# Patient Record
Sex: MALE | Race: WHITE | URBAN - METROPOLITAN AREA
[De-identification: names, ages, dates, MRNs, and addresses within clinical notes are randomized per-mention and may not be internally consistent; named-entity substitution may affect disease eponyms.]

---

## 2017-05-08 ENCOUNTER — OFFICE (OUTPATIENT)
Dept: URBAN - METROPOLITAN AREA CLINIC 78 | Facility: CLINIC | Age: 77
End: 2017-05-08

## 2017-05-08 VITALS
SYSTOLIC BLOOD PRESSURE: 135 MMHG | WEIGHT: 244 LBS | DIASTOLIC BLOOD PRESSURE: 76 MMHG | HEART RATE: 83 BPM | HEIGHT: 78 IN | TEMPERATURE: 97.3 F

## 2017-05-08 DIAGNOSIS — D68.61 ANTIPHOSPHOLIPID SYNDROME: ICD-10-CM

## 2017-05-08 DIAGNOSIS — Z12.11 ENCOUNTER FOR SCREENING FOR MALIGNANT NEOPLASM OF COLON: ICD-10-CM

## 2017-05-08 DIAGNOSIS — Z79.01 LONG TERM (CURRENT) USE OF ANTICOAGULANTS: ICD-10-CM

## 2017-05-08 PROCEDURE — 99204 OFFICE O/P NEW MOD 45 MIN: CPT

## 2017-05-08 NOTE — SERVICEHPINOTES
CARI GREGORY   is a   77   year old male who is being seen in consultation at the request of   DAGOBERTO HONG   to discuss a screening colonoscopy. He has only had one complete colonoscopy many years ago and then two incomplete ones since then -- last was about 10 years ago. Procedures were not done under sedation so had to be terminated early due to pain (not a prep issue). He reports at least one BM daily denies constipation, change in bowel habits, or blood in stools. He denies any GI concerns.MAL Gupta has PMH DVT and antiphospholipid syndrome so takes warfarin regularly. He has needed to do Lovenox bridging for other procedures/surgeries so has already discussed with his hematologist and has the Lovenox on hand. He also has ability to do home PT/INR test. He denies h/o heart disease.

## 2017-06-01 ENCOUNTER — AMBULATORY SURGICAL CENTER (OUTPATIENT)
Dept: URBAN - METROPOLITAN AREA SURGERY 21 | Facility: SURGERY | Age: 77
End: 2017-06-01

## 2017-06-01 DIAGNOSIS — D12.2 BENIGN NEOPLASM OF ASCENDING COLON: ICD-10-CM

## 2017-06-01 DIAGNOSIS — Z12.11 ENCOUNTER FOR SCREENING FOR MALIGNANT NEOPLASM OF COLON: ICD-10-CM

## 2017-06-01 DIAGNOSIS — K56.2 VOLVULUS: ICD-10-CM

## 2017-06-01 PROCEDURE — 45380 COLONOSCOPY AND BIOPSY: CPT | Mod: PT

## 2017-07-27 ENCOUNTER — OFFICE (OUTPATIENT)
Dept: URBAN - METROPOLITAN AREA CLINIC 78 | Facility: CLINIC | Age: 77
End: 2017-07-27

## 2017-07-27 VITALS
TEMPERATURE: 97.2 F | HEIGHT: 78 IN | WEIGHT: 227 LBS | HEART RATE: 72 BPM | SYSTOLIC BLOOD PRESSURE: 112 MMHG | DIASTOLIC BLOOD PRESSURE: 66 MMHG

## 2017-07-27 DIAGNOSIS — R19.7 DIARRHEA, UNSPECIFIED: ICD-10-CM

## 2017-07-27 DIAGNOSIS — R10.84 GENERALIZED ABDOMINAL PAIN: ICD-10-CM

## 2017-07-27 DIAGNOSIS — R10.13 EPIGASTRIC PAIN: ICD-10-CM

## 2017-07-27 DIAGNOSIS — Z79.01 LONG TERM (CURRENT) USE OF ANTICOAGULANTS: ICD-10-CM

## 2017-07-27 DIAGNOSIS — D68.61 ANTIPHOSPHOLIPID SYNDROME: ICD-10-CM

## 2017-07-27 PROCEDURE — 99215 OFFICE O/P EST HI 40 MIN: CPT

## 2017-07-27 RX ORDER — DICYCLOMINE HYDROCHLORIDE 10 MG/1
CAPSULE ORAL
Qty: 90 | Refills: 1 | Status: COMPLETED
Start: 2017-07-27 | End: 2017-09-18

## 2017-07-27 NOTE — SERVICEHPINOTES
76 yo male presents with diarrhea and abdominal pain. He had a colonoscopy on June 1st for routine screening - had adenomatous polyp, tortuous colon, small angiodysplastic lesion in cecum, and hemorrhoids. He started having some diarrhea a couple weeks later. He also was having some vomiting and abdominal pain. He went to [facility], was sent to ER, had CT scan and was told partial SBO and hernia. Was kept overnight, saw surgeon, was told this resolved by next morning. He continued to have symptoms and had further w/u by Dr. Magana ([facility]). Still had partial SBO and it was then noted that his CT showed small nodular mesenteric mass. Has PET scan scheduled for August 1st. He has continued to have some diarrhea, though had a formed BM yesterday (but had used Imodium). Currently having one BM per day but can have up to 4 per day. Has lost nearly 20 pounds. He is eating yogurt, eggs, soup, Ensure. After eating he has abdominal pain from upper abdomen to lower abdomen. Pain is crampy and he has a lot of bloating.He has PMH DVT and antiphospholipid syndrome so takes warfarin regularly. He has needed to do Lovenox bridging for other procedures/surgeries so has already discussed with his hematologist and has the Lovenox on hand. He also has ability to do home PT/INR test. He denies h/o heart disease.

## 2017-07-31 LAB
C DIFFICILE TOXIN GENE NAA: NEGATIVE
RESULT: RESULT 1: (no result)
RESULT: RESULT 1: (no result)
STOOL CULTURE: CAMPYLOBACTER CULTURE: (no result)
STOOL CULTURE: E COLI SHIGA TOXIN EIA: NEGATIVE
STOOL CULTURE: SALMONELLA/SHIGELLA SCREEN: (no result)

## 2017-08-14 ENCOUNTER — AMBULATORY SURGICAL CENTER (OUTPATIENT)
Dept: URBAN - METROPOLITAN AREA SURGERY 21 | Facility: SURGERY | Age: 77
End: 2017-08-14

## 2017-08-14 DIAGNOSIS — R10.13 EPIGASTRIC PAIN: ICD-10-CM

## 2017-08-14 DIAGNOSIS — R63.4 ABNORMAL WEIGHT LOSS: ICD-10-CM

## 2017-08-14 DIAGNOSIS — K29.60 OTHER GASTRITIS WITHOUT BLEEDING: ICD-10-CM

## 2017-08-14 PROCEDURE — 43239 EGD BIOPSY SINGLE/MULTIPLE: CPT

## 2017-09-18 ENCOUNTER — OFFICE (OUTPATIENT)
Dept: URBAN - METROPOLITAN AREA CLINIC 78 | Facility: CLINIC | Age: 77
End: 2017-09-18
Payer: COMMERCIAL

## 2017-09-18 VITALS
DIASTOLIC BLOOD PRESSURE: 73 MMHG | HEART RATE: 122 BPM | WEIGHT: 199 LBS | TEMPERATURE: 97.4 F | HEIGHT: 78 IN | SYSTOLIC BLOOD PRESSURE: 100 MMHG

## 2017-09-18 DIAGNOSIS — R10.13 EPIGASTRIC PAIN: ICD-10-CM

## 2017-09-18 PROCEDURE — 99214 OFFICE O/P EST MOD 30 MIN: CPT

## 2017-12-27 ENCOUNTER — OFFICE (OUTPATIENT)
Dept: URBAN - METROPOLITAN AREA CLINIC 33 | Facility: CLINIC | Age: 77
End: 2017-12-27
Payer: COMMERCIAL

## 2017-12-27 DIAGNOSIS — R10.13 EPIGASTRIC PAIN: ICD-10-CM

## 2017-12-27 DIAGNOSIS — K29.60 OTHER GASTRITIS WITHOUT BLEEDING: ICD-10-CM

## 2017-12-27 PROCEDURE — 91065 BREATH HYDROGEN/METHANE TEST: CPT

## 2018-01-08 ENCOUNTER — OFFICE (OUTPATIENT)
Dept: URBAN - METROPOLITAN AREA CLINIC 78 | Facility: CLINIC | Age: 78
End: 2018-01-08

## 2018-01-08 VITALS
HEIGHT: 78 IN | SYSTOLIC BLOOD PRESSURE: 142 MMHG | DIASTOLIC BLOOD PRESSURE: 70 MMHG | TEMPERATURE: 97 F | HEART RATE: 95 BPM | WEIGHT: 223 LBS

## 2018-01-08 DIAGNOSIS — R10.13 EPIGASTRIC PAIN: ICD-10-CM

## 2018-01-08 DIAGNOSIS — R19.4 CHANGE IN BOWEL HABIT: ICD-10-CM

## 2018-01-08 DIAGNOSIS — K59.1 FUNCTIONAL DIARRHEA: ICD-10-CM

## 2018-01-08 PROCEDURE — 99214 OFFICE O/P EST MOD 30 MIN: CPT

## 2018-01-08 RX ORDER — RIFAXIMIN 200 MG/1
600 TABLET ORAL
Qty: 15 | Refills: 3 | Status: COMPLETED
Start: 2018-01-08 | End: 2018-01-12 | Stop reason: CLARIF

## 2018-04-24 ENCOUNTER — INPATIENT HOSPITAL (OUTPATIENT)
Dept: URBAN - METROPOLITAN AREA HOSPITAL 60 | Facility: HOSPITAL | Age: 78
End: 2018-04-24
Payer: COMMERCIAL

## 2018-04-24 DIAGNOSIS — R74.8 ABNORMAL LEVELS OF OTHER SERUM ENZYMES: ICD-10-CM

## 2018-04-24 DIAGNOSIS — K80.21 CALCULUS OF GALLBLADDER WITHOUT CHOLECYSTITIS WITH OBSTRUCTI: ICD-10-CM

## 2018-04-24 DIAGNOSIS — R10.11 RIGHT UPPER QUADRANT PAIN: ICD-10-CM

## 2018-04-24 PROCEDURE — 99222 1ST HOSP IP/OBS MODERATE 55: CPT

## 2018-04-30 ENCOUNTER — OFFICE (OUTPATIENT)
Dept: URBAN - METROPOLITAN AREA CLINIC 78 | Facility: CLINIC | Age: 78
End: 2018-04-30

## 2018-04-30 VITALS
WEIGHT: 210 LBS | DIASTOLIC BLOOD PRESSURE: 56 MMHG | SYSTOLIC BLOOD PRESSURE: 76 MMHG | HEART RATE: 144 BPM | TEMPERATURE: 97.4 F | HEIGHT: 78 IN

## 2018-04-30 DIAGNOSIS — K83.1 OBSTRUCTION OF BILE DUCT: ICD-10-CM

## 2018-04-30 DIAGNOSIS — I82.409 ACUTE EMBOLISM AND THROMBOSIS OF UNSPECIFIED DEEP VEINS OF U: ICD-10-CM

## 2018-04-30 DIAGNOSIS — E86.1 HYPOVOLEMIA: ICD-10-CM

## 2018-04-30 PROCEDURE — 99214 OFFICE O/P EST MOD 30 MIN: CPT

## 2018-05-24 ENCOUNTER — OFFICE (OUTPATIENT)
Dept: URBAN - METROPOLITAN AREA CLINIC 78 | Facility: CLINIC | Age: 78
End: 2018-05-24
Payer: COMMERCIAL

## 2018-05-24 VITALS
SYSTOLIC BLOOD PRESSURE: 130 MMHG | TEMPERATURE: 97.6 F | WEIGHT: 212 LBS | HEART RATE: 93 BPM | HEIGHT: 78 IN | DIASTOLIC BLOOD PRESSURE: 77 MMHG

## 2018-05-24 DIAGNOSIS — K59.1 FUNCTIONAL DIARRHEA: ICD-10-CM

## 2018-05-24 DIAGNOSIS — K83.1 OBSTRUCTION OF BILE DUCT: ICD-10-CM

## 2018-05-24 PROCEDURE — 99214 OFFICE O/P EST MOD 30 MIN: CPT

## 2018-05-24 RX ORDER — LOPERAMIDE HCL 2 MG
TABLET ORAL
Qty: 60 | Refills: 2 | Status: COMPLETED
Start: 2018-05-24 | End: 2018-07-18

## 2018-05-24 NOTE — SERVICEHPINOTES
CARI GREGORY   is a   78   male who complains of diarrhea onset s/p cholecystectomy 04/19/18. He has a CBD stone removed via ERCP 05/07/18 by Dr Shipley and currently has SHERRIE tube in place for another 2 weeks to complete 6 week time frame. He mentions having SHERRIE tube flushed and follow up appointment scheduled for June 14th with general surgeon Dr Ilya Pillai. He says no redness or infection at the SHERRIE tube sites. He reports intermittent diarrhea with BM 4x/day, BSS type 6 or 7 along with fecal urgency. There is no fecal urgency at night. No blood in stool. No abdominal cramping. He tried Imodium x 3 days that helps. He tried cholestyramine powder that has not helped. He took prophylactic amoxicillin this morning for dental work. Denies fevers, jaundice, nausea, vomiting, abdominal pain, blood in stool, rectal bleeding, weight loss.    Discussed recent labs from 05/03/18: Cre 1.68, Alk phos 257, mild anemia, and rest of labs overall normal. Candy is frustrated about the diarrhea and mentions having SBO breath test back in 12/2017 that was positive then rx Xifaxan that insurance did not cover so used Levoquin 500 mg x 7 days. He wants to have this abx rx to him now for his diarrhea that is likely due to recent gallbladder pathology.   MAL

## 2018-06-05 ENCOUNTER — OFFICE (OUTPATIENT)
Dept: URBAN - METROPOLITAN AREA CLINIC 33 | Facility: CLINIC | Age: 78
End: 2018-06-05
Payer: COMMERCIAL

## 2018-06-05 DIAGNOSIS — K91.89 OTHER POSTPROCEDURAL COMPLICATIONS AND DISORDERS OF DIGESTIV: ICD-10-CM

## 2018-06-05 DIAGNOSIS — R15.2 FECAL URGENCY: ICD-10-CM

## 2018-06-05 PROCEDURE — 91065 BREATH HYDROGEN/METHANE TEST: CPT

## 2018-07-16 PROBLEM — K83.1 OBSTRUCTION OF BILE DUCT: Status: ACTIVE | Noted: 2018-07-16

## 2018-07-18 ENCOUNTER — OFFICE (OUTPATIENT)
Dept: URBAN - METROPOLITAN AREA CLINIC 101 | Facility: CLINIC | Age: 78
End: 2018-07-18
Payer: COMMERCIAL

## 2018-07-18 VITALS
TEMPERATURE: 97.3 F | HEART RATE: 110 BPM | WEIGHT: 196 LBS | SYSTOLIC BLOOD PRESSURE: 114 MMHG | DIASTOLIC BLOOD PRESSURE: 74 MMHG | HEIGHT: 78 IN

## 2018-07-18 DIAGNOSIS — R19.7 DIARRHEA, UNSPECIFIED: ICD-10-CM

## 2018-07-18 DIAGNOSIS — R10.84 GENERALIZED ABDOMINAL PAIN: ICD-10-CM

## 2018-07-18 PROCEDURE — 99214 OFFICE O/P EST MOD 30 MIN: CPT

## 2018-07-18 RX ORDER — COLESTIPOL HYDROCHLORIDE 1 G/1
4 TABLET ORAL
Qty: 120 | Refills: 0 | Status: COMPLETED
Start: 2018-07-18 | End: 2018-08-27

## 2018-07-18 NOTE — SERVICEHPINOTES
CARI GREGORY   is a   78   male who complains of continued diarrhea since his cholecystectomy. He has had issues going back as far as last year. 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 The diarrhea seemed to improve for a while. Later in 2017 he started to have increased gas and intermittent diarrhea. SIBO testing was borderline and he was treated with levaquin for 7 days. He then had cholecystectomy in April and symptoms seem to worsen. He will have about 3 loose BMs in the morning, spread out over a couple of hours. He has had increased bowel sounds, bloating, belching, and intermittent crampy abdominal pain. He then will get gurgling noise which seems to help relieve the pain. He has had nausea on occasion but no vomiting. No melena or rectal bleeding. He has lost about 30 lbs since his gallbladder surgery. He denies any fevers but has chills from time to time. He doesn't wake up in the middle of the night to have a BM but will have gas at night sometimes. He started a probiotic a couple of days ago.

## 2018-07-19 LAB — C DIFFICILE TOXIN GENE NAA: POSITIVE

## 2018-07-20 LAB
5-HIAA,QUANT.,24 HR URINE: 5-HIAA, URINE, 24HR: 2.6 MG/24 HR (ref 0–14.9)
5-HIAA,QUANT.,24 HR URINE: 5-HIAA, URINE: 4 MG/L

## 2018-08-06 LAB — C DIFFICILE TOXIN GENE NAA: NEGATIVE

## 2018-08-14 ENCOUNTER — ON CAMPUS - OUTPATIENT (OUTPATIENT)
Dept: URBAN - METROPOLITAN AREA HOSPITAL 35 | Facility: HOSPITAL | Age: 78
End: 2018-08-14
Payer: COMMERCIAL

## 2018-08-14 DIAGNOSIS — K83.1 OBSTRUCTION OF BILE DUCT: ICD-10-CM

## 2018-08-14 PROCEDURE — 43276 ERCP STENT EXCHANGE W/DILATE: CPT

## 2018-08-27 ENCOUNTER — OFFICE (OUTPATIENT)
Dept: URBAN - METROPOLITAN AREA CLINIC 101 | Facility: CLINIC | Age: 78
End: 2018-08-27
Payer: COMMERCIAL

## 2018-08-27 VITALS
SYSTOLIC BLOOD PRESSURE: 116 MMHG | DIASTOLIC BLOOD PRESSURE: 66 MMHG | WEIGHT: 184 LBS | TEMPERATURE: 97.9 F | HEIGHT: 78 IN | HEART RATE: 109 BPM

## 2018-08-27 DIAGNOSIS — R19.7 DIARRHEA, UNSPECIFIED: ICD-10-CM

## 2018-08-27 DIAGNOSIS — K21.9 GASTRO-ESOPHAGEAL REFLUX DISEASE WITHOUT ESOPHAGITIS: ICD-10-CM

## 2018-08-27 PROCEDURE — 99214 OFFICE O/P EST MOD 30 MIN: CPT

## 2018-08-27 RX ORDER — LOPERAMIDE HYDROCHLORIDE 2 MG/1
8 CAPSULE ORAL
Qty: 120 | Refills: 1 | Status: COMPLETED
End: 2019-01-01

## 2018-08-27 RX ORDER — DICYCLOMINE HYDROCHLORIDE 10 MG/1
30 CAPSULE ORAL
Qty: 90 | Refills: 1 | Status: COMPLETED
End: 2019-01-01

## 2018-08-27 NOTE — SERVICEHPINOTES
CARI GREGORY   is a   78   male who complains of continued diarrhea which he says varies in severity. He did not notice much of an improvement with colestid.  His last c diff test was negative. He saw surgery for prior CT findings but there is no plan for any surgery at this time. He is on a gluten free and dairy free diet although neither food group bothers him from what he can see. He is continuing to lose weight which he attributes to diarrhea. He notes urgency/tenesumus intermittently. He denies any vomiting but has had episodes of nausea and reflux like symptoms, especially at night when lying down  (he sleeps flat). He denies any blood in stool or melena. No dysphagia.

## 2018-09-25 ENCOUNTER — INPATIENT HOSPITAL (OUTPATIENT)
Dept: URBAN - METROPOLITAN AREA HOSPITAL 60 | Facility: HOSPITAL | Age: 78
End: 2018-09-25
Payer: COMMERCIAL

## 2018-09-25 DIAGNOSIS — R19.7 DIARRHEA, UNSPECIFIED: ICD-10-CM

## 2018-09-25 DIAGNOSIS — K56.699 OTHER INTESTINAL OBSTRUCTION UNSPECIFIED AS TO PARTIAL VERSU: ICD-10-CM

## 2018-09-25 DIAGNOSIS — R10.13 EPIGASTRIC PAIN: ICD-10-CM

## 2018-09-25 DIAGNOSIS — R11.2 NAUSEA WITH VOMITING, UNSPECIFIED: ICD-10-CM

## 2018-09-25 DIAGNOSIS — R63.4 ABNORMAL WEIGHT LOSS: ICD-10-CM

## 2018-09-25 PROCEDURE — 99283 EMERGENCY DEPT VISIT LOW MDM: CPT

## 2019-01-01 ENCOUNTER — INPATIENT HOSPITAL (OUTPATIENT)
Dept: URBAN - METROPOLITAN AREA HOSPITAL 60 | Facility: HOSPITAL | Age: 79
End: 2019-01-01
Payer: COMMERCIAL

## 2019-01-01 ENCOUNTER — OFFICE (OUTPATIENT)
Dept: URBAN - METROPOLITAN AREA CLINIC 101 | Facility: CLINIC | Age: 79
End: 2019-01-01
Payer: COMMERCIAL

## 2019-01-01 VITALS
TEMPERATURE: 97.9 F | DIASTOLIC BLOOD PRESSURE: 60 MMHG | HEIGHT: 78 IN | WEIGHT: 171 LBS | HEART RATE: 87 BPM | SYSTOLIC BLOOD PRESSURE: 122 MMHG

## 2019-01-01 DIAGNOSIS — K62.1 RECTAL POLYP: ICD-10-CM

## 2019-01-01 DIAGNOSIS — K56.609 UNSPECIFIED INTESTINAL OBSTRUCTION, UNSPECIFIED AS TO PARTIA: ICD-10-CM

## 2019-01-01 DIAGNOSIS — R93.5 ABNORMAL FINDINGS ON DIAGNOSTIC IMAGING OF OTHER ABDOMINAL R: ICD-10-CM

## 2019-01-01 DIAGNOSIS — K59.09 OTHER CONSTIPATION: ICD-10-CM

## 2019-01-01 DIAGNOSIS — C79.9 SECONDARY MALIGNANT NEOPLASM OF UNSPECIFIED SITE: ICD-10-CM

## 2019-01-01 DIAGNOSIS — R10.84 GENERALIZED ABDOMINAL PAIN: ICD-10-CM

## 2019-01-01 PROCEDURE — 99231 SBSQ HOSP IP/OBS SF/LOW 25: CPT

## 2019-01-01 PROCEDURE — 99232 SBSQ HOSP IP/OBS MODERATE 35: CPT

## 2019-01-01 PROCEDURE — 45331 SIGMOIDOSCOPY AND BIOPSY: CPT

## 2019-01-01 PROCEDURE — 99222 1ST HOSP IP/OBS MODERATE 55: CPT

## 2019-01-01 PROCEDURE — 99214 OFFICE O/P EST MOD 30 MIN: CPT

## 2019-01-01 RX ORDER — HYOSCYAMINE SULFATE 0.12 MG/1
TABLET ORAL; SUBLINGUAL
Qty: 90 | Refills: 1 | Status: ACTIVE

## 2019-09-27 NOTE — SERVICEHPINOTES
CARI GREGORY   is a   79   male who complains of intermittent but frequent abdominal pain, often worse within 30-60 minutes after eating. He is afraid to eat as a result. Pain can get better with belching/flatus/BM. He states pain starts in lower abdomen (points to suprapubic region) and migrates/radiates upwards. He was recently hospitalized for a suspected bowel obstruction and has had multiple hospital admissions for this. In June 2019 he had similar presentation and had attempted colonoscopy by Dr. Moulton but narrowed/tortuous sigmoid prevented advancement of the scope. He was transferred to [place] where colonoscopy was reattempted and was successful. He was managed there for bowel obstruction and then discharged. He was readmitted 3 weeks later with similar presentation and was conservatively managed. After discharge at that time he did well for about 8 weeks, tolerating po intake and regaining weight. However symptoms recurred earlier this month. CT scans revealed suspected obstructive type picture. He was again conservatively managed. Repeated conversations with different surgeons have led to same conclusion:  surgery would be a last resort in case of emergency. He continues to have pain. He has intermittent small volume diarrhea. He denies any blood in stool or melena. He denies any nausea, vomiting. He can have reflux/heartburn. He has tried ibgard and bentyl in the past without much success.

## 2020-01-01 ENCOUNTER — INPATIENT HOSPITAL (OUTPATIENT)
Dept: URBAN - METROPOLITAN AREA HOSPITAL 60 | Facility: HOSPITAL | Age: 80
End: 2020-01-01

## 2020-01-01 DIAGNOSIS — R19.7 DIARRHEA, UNSPECIFIED: ICD-10-CM

## 2020-01-01 DIAGNOSIS — D68.61 ANTIPHOSPHOLIPID SYNDROME: ICD-10-CM

## 2020-01-01 DIAGNOSIS — Z79.01 LONG TERM (CURRENT) USE OF ANTICOAGULANTS: ICD-10-CM

## 2020-01-01 DIAGNOSIS — K56.609 UNSPECIFIED INTESTINAL OBSTRUCTION, UNSPECIFIED AS TO PARTIA: ICD-10-CM

## 2020-01-01 DIAGNOSIS — R10.9 UNSPECIFIED ABDOMINAL PAIN: ICD-10-CM

## 2020-01-01 DIAGNOSIS — J18.9 PNEUMONIA, UNSPECIFIED ORGANISM: ICD-10-CM

## 2020-01-01 DIAGNOSIS — E34.0 CARCINOID SYNDROME: ICD-10-CM

## 2020-01-01 PROCEDURE — 99232 SBSQ HOSP IP/OBS MODERATE 35: CPT

## 2020-01-01 PROCEDURE — 99222 1ST HOSP IP/OBS MODERATE 55: CPT
